# Patient Record
Sex: FEMALE | Race: WHITE | URBAN - METROPOLITAN AREA
[De-identification: names, ages, dates, MRNs, and addresses within clinical notes are randomized per-mention and may not be internally consistent; named-entity substitution may affect disease eponyms.]

---

## 2019-10-24 ENCOUNTER — OFFICE VISIT (OUTPATIENT)
Dept: URGENT CARE | Facility: URGENT CARE | Age: 19
End: 2019-10-24
Payer: COMMERCIAL

## 2019-10-24 VITALS
SYSTOLIC BLOOD PRESSURE: 90 MMHG | TEMPERATURE: 97.7 F | OXYGEN SATURATION: 100 % | BODY MASS INDEX: 19.09 KG/M2 | DIASTOLIC BLOOD PRESSURE: 50 MMHG | HEART RATE: 101 BPM | WEIGHT: 111.8 LBS | HEIGHT: 64 IN

## 2019-10-24 DIAGNOSIS — G43.909 MIGRAINE WITHOUT STATUS MIGRAINOSUS, NOT INTRACTABLE, UNSPECIFIED MIGRAINE TYPE: Primary | ICD-10-CM

## 2019-10-24 DIAGNOSIS — R11.0 NAUSEA: ICD-10-CM

## 2019-10-24 PROCEDURE — 99204 OFFICE O/P NEW MOD 45 MIN: CPT | Mod: 25 | Performed by: FAMILY MEDICINE

## 2019-10-24 PROCEDURE — 96372 THER/PROPH/DIAG INJ SC/IM: CPT | Performed by: FAMILY MEDICINE

## 2019-10-24 RX ORDER — ONDANSETRON 4 MG/1
4 TABLET, ORALLY DISINTEGRATING ORAL ONCE
Status: COMPLETED | OUTPATIENT
Start: 2019-10-24 | End: 2019-10-24

## 2019-10-24 RX ORDER — VENLAFAXINE HYDROCHLORIDE 37.5 MG/1
37.5 CAPSULE, EXTENDED RELEASE ORAL
COMMUNITY
Start: 2019-09-24 | End: 2020-09-23

## 2019-10-24 RX ORDER — KETOROLAC TROMETHAMINE 30 MG/ML
60 INJECTION, SOLUTION INTRAMUSCULAR; INTRAVENOUS ONCE
Status: COMPLETED | OUTPATIENT
Start: 2019-10-24 | End: 2019-10-24

## 2019-10-24 RX ADMIN — ONDANSETRON 4 MG: 4 TABLET, ORALLY DISINTEGRATING ORAL at 10:02

## 2019-10-24 RX ADMIN — KETOROLAC TROMETHAMINE 60 MG: 30 INJECTION, SOLUTION INTRAMUSCULAR; INTRAVENOUS at 10:01

## 2019-10-24 ASSESSMENT — MIFFLIN-ST. JEOR: SCORE: 1257.12

## 2019-10-24 NOTE — PROGRESS NOTES
"SUBJECTIVE:  Maria Luisa Vick is a 19 year old female with h/o migraine who comes in for evaluation of headache.  Headache began 1day(s)}ago and is sudden onset  DESCRIPTION OF HEADACHE:   Location of pain: left-sided unilateral   Radiation of pain?: NO   Character of pain:sharp and throbbing   Severity of pain: moderate   Accompanying symptoms: nausea, vomiting, sonophobia and photophobia   Prodromal sx?: none   Rapidity of onset: abrupt     Past Medical History:   Diagnosis Date     Migraine      History reviewed. No pertinent family history.      History of Migranes: Yes   Are most headaches similar in presentation? YES    TYPICAL PRECIPITANTS OF HEADACHE: None    CURRENT USE OF MEDS TO TREAT HA:   Abortive meds? aspirin/acetaminophen/caffeine    Daily use? NO   Prophylactic meds? none    ADDITIONAL RELEVANT HISTORY:  Exposure to carbon monoxide? NO  Substance use: denies any use  Neurologic ROS: Denies, dizziness, syncope, focal weakness and sensory deficits.    Past Medical History:   Diagnosis Date     Migraine      Current Outpatient Medications   Medication Sig Dispense Refill     venlafaxine (EFFEXOR-XR) 37.5 MG 24 hr capsule Take 37.5 mg by mouth       Social History     Tobacco Use     Smoking status: Not on file   Substance Use Topics     Alcohol use: Not on file       ROS:  10 point ROS of systems including Constitutional, Eyes, Respiratory, Cardiovascular, Gastroenterology, Genitourinary, Integumentary, Muscularskeletal, Psychiatric were all negative except for pertinent positives noted in my HPI         OBJECTIVE:  BP 90/50   Pulse 101   Temp 97.7  F (36.5  C) (Tympanic)   Ht 1.626 m (5' 4\")   Wt 50.7 kg (111 lb 12.8 oz)   SpO2 100%   BMI 19.19 kg/m    GENERAL APPEARANCE: healthy, alert and no distress  EYES: EOMI,  PERRL, conjunctiva clear  HENT: ear canals and TM's normal.  Nose and mouth without ulcers, erythema or lesions  NECK: supple, nontender, no lymphadenopathy  RESP: lungs clear to " auscultation - no rales, rhonchi or wheezes  CV: regular rates and rhythm, normal S1 S2, no murmur noted  ABDOMEN:  soft, nontender, no HSM or masses and bowel sounds normal  SKIN: no suspicious lesions or rashes  Neuro-Pupils equal, round, reactive to light. Extraocular movements full. Visual fields full. Face moves symmetrically. Tongue midline.Motor strength 5/5. Reflexes were 2/4. Toe signs were downgoing. Normal position sense. Good finger-nose-finger and fine finger movement. Gait: she ginna from a chair without difficulty   PSYCH: mentation appears normal    ASSESSMENT:  Maria Luisa was seen today for urgent care and headache.    Diagnoses and all orders for this visit:    Migraine without status migrainosus, not intractable, unspecified migraine type  -     ketorolac (TORADOL) injection 60 mg  -     ondansetron (ZOFRAN-ODT) ODT tab 4 mg    Nausea      After the Toradol shot and the Zofran she felt much better though threw up once    PLAN:  See orders in Epic  I reviewed with pt about the clinical findings  Suggested to watch for any worsening signs or symptoms   This patient to take enough rest and can take ibuprofen if the headache comes back.  Patient understood and agreed with plan  Follow up if  symptoms fail to improve or worsens   Pt understood and agreed with plan     did spent>45 minutes with patient and > 50% of the time was for answering questions, discussing findings, counseling and coordination of care     Jocelynn Dillard MD

## 2019-10-24 NOTE — PROGRESS NOTES
Due to injection administration, patient instructed to remain in clinic for 15 minutes  afterwards, and to report any adverse reaction to me immediately.